# Patient Record
Sex: FEMALE | URBAN - METROPOLITAN AREA
[De-identification: names, ages, dates, MRNs, and addresses within clinical notes are randomized per-mention and may not be internally consistent; named-entity substitution may affect disease eponyms.]

---

## 2022-11-07 ENCOUNTER — APPOINTMENT (OUTPATIENT)
Dept: URBAN - METROPOLITAN AREA CLINIC 207 | Age: 37
Setting detail: DERMATOLOGY
End: 2022-11-08

## 2022-11-07 DIAGNOSIS — L42 PITYRIASIS ROSEA: ICD-10-CM

## 2022-11-07 DIAGNOSIS — L29.8 OTHER PRURITUS: ICD-10-CM

## 2022-11-07 DIAGNOSIS — L20.84 INTRINSIC (ALLERGIC) ECZEMA: ICD-10-CM

## 2022-11-07 PROCEDURE — OTHER PRESCRIPTION: OTHER

## 2022-11-07 PROCEDURE — 99203 OFFICE O/P NEW LOW 30 MIN: CPT

## 2022-11-07 PROCEDURE — OTHER COUNSELING: OTHER

## 2022-11-07 PROCEDURE — OTHER PRESCRIPTION MEDICATION MANAGEMENT: OTHER

## 2022-11-07 RX ORDER — TRIAMCINOLONE ACETONIDE 1 MG/G
CREAM TOPICAL BID
Qty: 80 | Refills: 0 | Status: ERX | COMMUNITY
Start: 2022-11-07

## 2022-11-07 ASSESSMENT — LOCATION ZONE DERM
LOCATION ZONE: TRUNK
LOCATION ZONE: LEG
LOCATION ZONE: EAR

## 2022-11-07 ASSESSMENT — LOCATION SIMPLE DESCRIPTION DERM
LOCATION SIMPLE: RIGHT EAR
LOCATION SIMPLE: LEFT THIGH
LOCATION SIMPLE: LEFT EAR
LOCATION SIMPLE: ABDOMEN
LOCATION SIMPLE: RIGHT UPPER BACK
LOCATION SIMPLE: RIGHT THIGH

## 2022-11-07 ASSESSMENT — LOCATION DETAILED DESCRIPTION DERM
LOCATION DETAILED: PERIUMBILICAL SKIN
LOCATION DETAILED: RIGHT SCAPHA
LOCATION DETAILED: EPIGASTRIC SKIN
LOCATION DETAILED: RIGHT INFERIOR MEDIAL UPPER BACK
LOCATION DETAILED: LEFT ANTIHELIX
LOCATION DETAILED: RIGHT ANTERIOR PROXIMAL THIGH
LOCATION DETAILED: LEFT ANTERIOR PROXIMAL THIGH

## 2022-11-07 NOTE — PROCEDURE: PRESCRIPTION MEDICATION MANAGEMENT
Detail Level: Zone
Render In Strict Bullet Format?: No
Initiate Treatment: Informed patient this condition is benign and not contagious. Advised duration of rash is typically 3 months (12 weeks) and can continue to spread to different areas of body.\\nDiscussed procedure of bx'ing if not resolved after 12 weeks from when rash started.\\nReviewed triggers of stress, weather changes, and recent illnesses.\\nDiscussed how this condition is chronic in nature and can reoccur.\\nDiscussed how rash can mimic a syphilis rash and can be tested through an RPR blood test. Patient denies h/o exposure. \\nInformed patient PIH can occur secondarily to rash and will resolve in time. Sunscreen SPF 30+ daily and sun protective clothing.\\n\\nRx: Triamcinolone cream - apply to affected areas twice a day (avoid face and neck) x2 weeks. PRN flare
Initiate Treatment: Apply small amount of Triamcinolone cream twice a day for up to 2 weeks. Repeat for flares\\nAvoid applying to internal ear and avoid q-tips

## 2022-11-07 NOTE — HPI: INFECTION (TINEA)
Is This A New Presentation, Or A Follow-Up?: Rash
Additional History: Patient states rash started underneath breasts, then spread. Patient concerned rash is ring worm.

## 2023-11-17 ENCOUNTER — APPOINTMENT (OUTPATIENT)
Dept: URBAN - METROPOLITAN AREA CLINIC 207 | Age: 38
Setting detail: DERMATOLOGY
End: 2023-11-23

## 2023-11-17 DIAGNOSIS — L81.1 CHLOASMA: ICD-10-CM

## 2023-11-17 DIAGNOSIS — L21.8 OTHER SEBORRHEIC DERMATITIS: ICD-10-CM

## 2023-11-17 DIAGNOSIS — D485 NEOPLASM OF UNCERTAIN BEHAVIOR OF SKIN: ICD-10-CM

## 2023-11-17 DIAGNOSIS — L81.4 OTHER MELANIN HYPERPIGMENTATION: ICD-10-CM

## 2023-11-17 PROBLEM — D48.5 NEOPLASM OF UNCERTAIN BEHAVIOR OF SKIN: Status: ACTIVE | Noted: 2023-11-17

## 2023-11-17 PROCEDURE — OTHER MIPS QUALITY: OTHER

## 2023-11-17 PROCEDURE — OTHER PRESCRIPTION MEDICATION MANAGEMENT: OTHER

## 2023-11-17 PROCEDURE — OTHER PRESCRIPTION: OTHER

## 2023-11-17 PROCEDURE — OTHER SUNSCREEN RECOMMENDATIONS: OTHER

## 2023-11-17 PROCEDURE — 99214 OFFICE O/P EST MOD 30 MIN: CPT

## 2023-11-17 PROCEDURE — OTHER OTHER: OTHER

## 2023-11-17 PROCEDURE — OTHER COUNSELING: OTHER

## 2023-11-17 RX ORDER — DESONIDE 0.5 MG/G
CREAM TOPICAL
Qty: 60 | Refills: 1 | Status: ERX | COMMUNITY
Start: 2023-11-17

## 2023-11-17 RX ORDER — KETOCONAZOLE 20 MG/G
CREAM TOPICAL
Qty: 30 | Refills: 3 | Status: ERX | COMMUNITY
Start: 2023-11-17

## 2023-11-17 RX ORDER — HYDROQUINONE 40 MG/G
CREAM TOPICAL
Qty: 28.4 | Refills: 1 | Status: ERX | COMMUNITY
Start: 2023-11-17

## 2023-11-17 RX ORDER — KETOCONAZOLE 20 MG/ML
SHAMPOO, SUSPENSION TOPICAL
Qty: 120 | Refills: 11 | Status: ERX | COMMUNITY
Start: 2023-11-17

## 2023-11-17 ASSESSMENT — LOCATION ZONE DERM
LOCATION ZONE: FACE
LOCATION ZONE: SCALP
LOCATION ZONE: EAR

## 2023-11-17 ASSESSMENT — LOCATION DETAILED DESCRIPTION DERM
LOCATION DETAILED: LEFT CRUS OF HELIX
LOCATION DETAILED: MID-FRONTAL SCALP
LOCATION DETAILED: POSTERIOR MID-PARIETAL SCALP
LOCATION DETAILED: LEFT CENTRAL MALAR CHEEK
LOCATION DETAILED: RIGHT INFERIOR CENTRAL MALAR CHEEK
LOCATION DETAILED: LEFT INFERIOR CENTRAL MALAR CHEEK

## 2023-11-17 ASSESSMENT — LOCATION SIMPLE DESCRIPTION DERM
LOCATION SIMPLE: RIGHT CHEEK
LOCATION SIMPLE: LEFT CHEEK
LOCATION SIMPLE: ANTERIOR SCALP
LOCATION SIMPLE: POSTERIOR SCALP
LOCATION SIMPLE: LEFT EAR

## 2023-11-17 NOTE — PROCEDURE: COUNSELING
Detail Level: Detailed
Cleanser Recommendations: CeraVe Hydrating Cleanser \\nCetaphil Gentle Cleanser \\nVanicream Cleanser
Moisturizer Recommendations: CeraVe Moisturizing Lotion/Cream \\nCetaphil Moisturizing Lotion/Cream\\nVanicream Lotion/Cream
Detail Level: Zone
Stable

## 2023-11-17 NOTE — HPI: RASH
What Type Of Note Output Would You Prefer (Optional)?: Standard Output
Is The Patient Presenting As Previously Scheduled?: Yes
How Severe Is Your Rash?: moderate
Is This A New Presentation, Or A Follow-Up?: Rash
Additional History: Also c/o  itchy, flaky skin on crown of scalp, currently using T gel shampoo

## 2023-11-17 NOTE — PROCEDURE: PRESCRIPTION MEDICATION MANAGEMENT
Detail Level: Zone
Render In Strict Bullet Format?: Yes
Initiate Treatment: Desonide cream- to apply bid to rash on ears, mix with ketoconazole cream\\nKetoconazole cream- to apply bid to rash on ears, mixed with Desonide cream
Initiate Treatment: Hydroquinone- to apply qhs\\nSunscreen daily

## 2023-11-17 NOTE — PROCEDURE: OTHER
Other (Free Text): Patient instructed to apply Vaseline daily to affected area. May consider biopsy if not improved at 2 wk f/u visit.
Render Risk Assessment In Note?: no
Detail Level: Zone
Note Text (......Xxx Chief Complaint.): This diagnosis correlates with the

## 2023-12-08 ENCOUNTER — APPOINTMENT (OUTPATIENT)
Dept: URBAN - METROPOLITAN AREA CLINIC 207 | Age: 38
Setting detail: DERMATOLOGY
End: 2023-12-18

## 2023-12-08 DIAGNOSIS — L21.8 OTHER SEBORRHEIC DERMATITIS: ICD-10-CM

## 2023-12-08 DIAGNOSIS — L65.9 NONSCARRING HAIR LOSS, UNSPECIFIED: ICD-10-CM

## 2023-12-08 PROCEDURE — 99214 OFFICE O/P EST MOD 30 MIN: CPT

## 2023-12-08 PROCEDURE — OTHER PRESCRIPTION: OTHER

## 2023-12-08 PROCEDURE — OTHER COUNSELING: OTHER

## 2023-12-08 PROCEDURE — OTHER ADDITIONAL NOTES: OTHER

## 2023-12-08 PROCEDURE — OTHER MIPS QUALITY: OTHER

## 2023-12-08 PROCEDURE — OTHER PRESCRIPTION MEDICATION MANAGEMENT: OTHER

## 2023-12-08 RX ORDER — FLUOCINONIDE 0.5 MG/ML
SOLUTION TOPICAL
Qty: 60 | Refills: 6 | Status: ERX | COMMUNITY
Start: 2023-12-08

## 2023-12-08 ASSESSMENT — LOCATION SIMPLE DESCRIPTION DERM
LOCATION SIMPLE: LEFT EAR
LOCATION SIMPLE: SCALP
LOCATION SIMPLE: ANTERIOR SCALP
LOCATION SIMPLE: RIGHT EAR

## 2023-12-08 ASSESSMENT — LOCATION DETAILED DESCRIPTION DERM
LOCATION DETAILED: RIGHT SUPERIOR CRUS OF ANTIHELIX
LOCATION DETAILED: LEFT SUPERIOR PARIETAL SCALP
LOCATION DETAILED: LEFT SUPERIOR CRUS OF ANTIHELIX
LOCATION DETAILED: MID-FRONTAL SCALP

## 2023-12-08 ASSESSMENT — LOCATION ZONE DERM
LOCATION ZONE: SCALP
LOCATION ZONE: EAR

## 2023-12-08 NOTE — PROCEDURE: COUNSELING
Cleanser Recommendations: CeraVe Hydrating Cleanser \\nCetaphil Gentle Cleanser \\nVanicream Cleanser
Moisturizer Recommendations: CeraVe Moisturizing Lotion/Cream \\nCetaphil Moisturizing Lotion/Cream\\nVanicream Lotion/Cream
Detail Level: Zone
Detail Level: Detailed

## 2023-12-08 NOTE — PROCEDURE: ADDITIONAL NOTES
Render Risk Assessment In Note?: no
Detail Level: Simple
Additional Notes: - Recommended that she get her annual blood work with her PCP. Patient states that she has her annual physical in January 2024, so we recommend that she add Ferritin and SHAISTA with Reflex to the blood work.\\n- Based on the blood work, will determine start of oral medication or biopsy to determine the etiology of her hair loss\\n- Recommended taking OTC Nutrafol and/or Vivascal as instructed daily
Additional Notes: crust on scalp has resolved. \\nmonitor for changes

## 2023-12-08 NOTE — PROCEDURE: PRESCRIPTION MEDICATION MANAGEMENT
Render In Strict Bullet Format?: Yes
Initiate Treatment: fluocinonide 0.05 % topical solution - Apply to scalp twice daily for 1-2 weeks, stop for 2 weeks, then repeat as needed for flaring. Avoid applying to face. ([facility])
Plan: - When flared: use the Ketoconazole shampoo and Fluocinonide topical solution on the scalp as instructed; for ears, apply the 1:1 ratio of Desonide and Ketoconazole creams twice a day for 2 weeks
Detail Level: Zone
Continue Regimen: ketoconazole 2 % topical cream - Mix 1:1 with Desonide. Apply to affected areas on face twice a day for 2 weeks. Repeat for flares. May use alone without mixing with topical steroid for maintenance daily. ([facility])\\n\\ndesonide 0.05 % topical cream - Mix 1:1 with Ketoconazole. Apply to affected area of the face twice daily for 2 weeks at a time, stop for two weeks, then repeat as needed for flares. ([facility])\\n\\nketoconazole 2 % shampoo - Lather on scalp and eyebrows, let sit for 3-5 mins, then rinse 2x a week as needed. ([facility])